# Patient Record
Sex: FEMALE | Race: WHITE | HISPANIC OR LATINO | Employment: UNEMPLOYED | URBAN - METROPOLITAN AREA
[De-identification: names, ages, dates, MRNs, and addresses within clinical notes are randomized per-mention and may not be internally consistent; named-entity substitution may affect disease eponyms.]

---

## 2021-06-10 ENCOUNTER — HOSPITAL ENCOUNTER (EMERGENCY)
Facility: HOSPITAL | Age: 36
Discharge: HOME | End: 2021-06-10
Attending: EMERGENCY MEDICINE | Admitting: EMERGENCY MEDICINE

## 2021-06-10 VITALS
HEART RATE: 102 BPM | SYSTOLIC BLOOD PRESSURE: 112 MMHG | RESPIRATION RATE: 16 BRPM | OXYGEN SATURATION: 100 % | WEIGHT: 141.09 LBS | BODY MASS INDEX: 30.44 KG/M2 | DIASTOLIC BLOOD PRESSURE: 77 MMHG | TEMPERATURE: 98.2 F | HEIGHT: 57 IN

## 2021-06-10 DIAGNOSIS — B97.7 HPV (HUMAN PAPILLOMA VIRUS) INFECTION: Primary | ICD-10-CM

## 2021-06-10 PROCEDURE — 99282 EMERGENCY DEPT VISIT SF MDM: CPT

## 2021-06-11 NOTE — ED PROVIDER NOTES
"Subjective   Patient is a 35-year-old  [place]ian-speaking female who presents to the ER today for a \"second opinion\".  States that she was diagnosed with HPV at her gynecology visit earlier today and is here because she believes she has cancer, and wants a second opinion.  Is asking for us to do a biopsy in the ER to check for cancer.  Tells me that she is scheduled for a another appointment July 1 for additional testing.  Denies any symptoms.  Specifically no vaginal discharge, bleeding, pain, or discomfort.      History provided by:  Patient  Illness  Timing:  Unable to specify  Progression:  Unable to specify  Chronicity:  New  Associated symptoms: no fatigue and no fever        Review of Systems   Constitutional: Negative for chills, fatigue and fever.   Genitourinary: Negative for dyspareunia, dysuria, flank pain, frequency, urgency, vaginal bleeding, vaginal discharge and vaginal pain.   All other systems reviewed and are negative.      No past medical history on file.    No Known Allergies    No past surgical history on file.    No family history on file.    Social History     Socioeconomic History   • Marital status:      Spouse name: Not on file   • Number of children: Not on file   • Years of education: Not on file   • Highest education level: Not on file           Objective   Physical Exam  Vitals and nursing note reviewed.   Cardiovascular:      Rate and Rhythm: Normal rate and regular rhythm.   Pulmonary:      Effort: Pulmonary effort is normal.      Breath sounds: Normal breath sounds.   Abdominal:      General: Abdomen is flat. Bowel sounds are normal.      Palpations: Abdomen is soft.   Neurological:      Mental Status: She is alert.   Psychiatric:         Mood and Affect: Mood normal.         Behavior: Behavior normal.         Procedures           ED Course      Using  I discussed with patient that a diagnosis of HPV does not necessarily mean that she has cancer.  Advised her " that additional testing such as a colposcopy would be needed in order to diagnose cervical cancer.  I called Cyn melendez at [facility] and verify that patient is scheduled for a colposcopy on July 1.  Instructed patient to keep that appointment.  Also advised patient that we were unable to do biopsies, or check for cancer in the ER.  Patient verbalized understanding of all discussed                                     MDM    Final diagnoses:   HPV (human papilloma virus) infection       ED Disposition  ED Disposition     ED Disposition Condition Comment    Discharge Stable           [facility] [address] [address]  [phone]  On 7/1/2021  for colposcopy         Medication List      No changes were made to your prescriptions during this visit.          Xiomy Mueller, APRN  06/10/21 2041